# Patient Record
Sex: MALE | Race: WHITE | NOT HISPANIC OR LATINO | URBAN - METROPOLITAN AREA
[De-identification: names, ages, dates, MRNs, and addresses within clinical notes are randomized per-mention and may not be internally consistent; named-entity substitution may affect disease eponyms.]

---

## 2018-01-22 ENCOUNTER — EMERGENCY (EMERGENCY)
Facility: HOSPITAL | Age: 43
LOS: 1 days | End: 2018-01-22
Payer: COMMERCIAL

## 2018-01-22 PROCEDURE — 71046 X-RAY EXAM CHEST 2 VIEWS: CPT | Mod: 26

## 2018-01-22 PROCEDURE — 99284 EMERGENCY DEPT VISIT MOD MDM: CPT

## 2018-10-28 ENCOUNTER — EMERGENCY (EMERGENCY)
Facility: HOSPITAL | Age: 43
LOS: 1 days | End: 2018-10-28
Payer: COMMERCIAL

## 2018-10-28 PROCEDURE — 99284 EMERGENCY DEPT VISIT MOD MDM: CPT

## 2018-10-28 PROCEDURE — 70450 CT HEAD/BRAIN W/O DYE: CPT | Mod: 26

## 2018-11-19 ENCOUNTER — APPOINTMENT (OUTPATIENT)
Dept: NEUROSURGERY | Facility: CLINIC | Age: 43
End: 2018-11-19
Payer: COMMERCIAL

## 2018-11-19 VITALS
DIASTOLIC BLOOD PRESSURE: 78 MMHG | WEIGHT: 225 LBS | SYSTOLIC BLOOD PRESSURE: 140 MMHG | HEIGHT: 67 IN | BODY MASS INDEX: 35.31 KG/M2

## 2018-11-19 DIAGNOSIS — G95.20 UNSPECIFIED CORD COMPRESSION: ICD-10-CM

## 2018-11-19 DIAGNOSIS — I10 ESSENTIAL (PRIMARY) HYPERTENSION: ICD-10-CM

## 2018-11-19 PROCEDURE — 99204 OFFICE O/P NEW MOD 45 MIN: CPT

## 2021-07-12 ENCOUNTER — RX RENEWAL (OUTPATIENT)
Age: 46
End: 2021-07-12

## 2021-08-16 RX ORDER — MAGNESIUM OXIDE 500 MG
500 TABLET ORAL
Refills: 0 | Status: ACTIVE | COMMUNITY

## 2021-08-23 ENCOUNTER — APPOINTMENT (OUTPATIENT)
Dept: FAMILY MEDICINE | Facility: CLINIC | Age: 46
End: 2021-08-23
Payer: COMMERCIAL

## 2021-08-23 ENCOUNTER — NON-APPOINTMENT (OUTPATIENT)
Age: 46
End: 2021-08-23

## 2021-08-23 VITALS
HEART RATE: 79 BPM | WEIGHT: 239 LBS | HEIGHT: 68 IN | DIASTOLIC BLOOD PRESSURE: 78 MMHG | BODY MASS INDEX: 36.22 KG/M2 | OXYGEN SATURATION: 98 % | TEMPERATURE: 98 F | SYSTOLIC BLOOD PRESSURE: 128 MMHG

## 2021-08-23 PROCEDURE — 99213 OFFICE O/P EST LOW 20 MIN: CPT

## 2021-08-23 RX ORDER — DEXLANSOPRAZOLE 60 MG/1
60 CAPSULE, DELAYED RELEASE ORAL
Refills: 0 | Status: DISCONTINUED | COMMUNITY
End: 2021-08-23

## 2021-08-23 NOTE — HISTORY OF PRESENT ILLNESS
[FreeTextEntry1] : feeling well\par \par presents for  refills\par \par follows with cardio.\par \par \par august 4th  got  covid vac  #  1\par \par 3 days later  tested positive for covid\par \par feeling well\par \par pt. will check with pharmacist  regarding when he should get his second  vaccine

## 2021-11-10 ENCOUNTER — RX RENEWAL (OUTPATIENT)
Age: 46
End: 2021-11-10

## 2021-11-22 ENCOUNTER — APPOINTMENT (OUTPATIENT)
Dept: FAMILY MEDICINE | Facility: CLINIC | Age: 46
End: 2021-11-22
Payer: COMMERCIAL

## 2021-11-22 VITALS
HEIGHT: 67 IN | HEART RATE: 75 BPM | WEIGHT: 240.38 LBS | SYSTOLIC BLOOD PRESSURE: 114 MMHG | DIASTOLIC BLOOD PRESSURE: 88 MMHG | OXYGEN SATURATION: 97 % | BODY MASS INDEX: 37.73 KG/M2 | TEMPERATURE: 97.8 F

## 2021-11-22 PROCEDURE — 99214 OFFICE O/P EST MOD 30 MIN: CPT

## 2021-12-15 ENCOUNTER — RX RENEWAL (OUTPATIENT)
Age: 46
End: 2021-12-15

## 2022-01-25 ENCOUNTER — APPOINTMENT (OUTPATIENT)
Dept: FAMILY MEDICINE | Facility: CLINIC | Age: 47
End: 2022-01-25
Payer: COMMERCIAL

## 2022-01-25 VITALS
WEIGHT: 247 LBS | TEMPERATURE: 97.7 F | HEART RATE: 87 BPM | OXYGEN SATURATION: 97 % | BODY MASS INDEX: 38.77 KG/M2 | HEIGHT: 67 IN | SYSTOLIC BLOOD PRESSURE: 120 MMHG | DIASTOLIC BLOOD PRESSURE: 78 MMHG

## 2022-01-25 DIAGNOSIS — R10.11 RIGHT UPPER QUADRANT PAIN: ICD-10-CM

## 2022-01-25 DIAGNOSIS — R10.12 LEFT UPPER QUADRANT PAIN: ICD-10-CM

## 2022-01-25 DIAGNOSIS — E04.1 NONTOXIC SINGLE THYROID NODULE: ICD-10-CM

## 2022-01-25 PROCEDURE — 99214 OFFICE O/P EST MOD 30 MIN: CPT

## 2022-01-25 NOTE — PLAN
[FreeTextEntry1] : Encouraged to refer to Cardio - Rib discomfort\par Fasting Labs RTO\par Thyroid Ultrasound\par And Ultrasound\par Medications renewed

## 2022-01-25 NOTE — REVIEW OF SYSTEMS
[Abdominal Pain] : abdominal pain [Negative] : Heme/Lymph [FreeTextEntry4] : thyroid nodule [FreeTextEntry7] : left upper quadrant [FreeTextEntry9] : left rib discomfort

## 2022-01-25 NOTE — ASSESSMENT
[FreeTextEntry1] : Anxiety\par HTN\par Hyperlipids\par Left Upper quadrant Rib Pain\par Thyroid enlargement

## 2022-04-25 ENCOUNTER — RX RENEWAL (OUTPATIENT)
Age: 47
End: 2022-04-25

## 2022-05-02 ENCOUNTER — RX RENEWAL (OUTPATIENT)
Age: 47
End: 2022-05-02

## 2022-06-01 NOTE — PHYSICAL EXAM
[Normal] : normal rate, regular rhythm, normal S1 and S2 and no murmur heard [No Edema] : there was no peripheral edema 01-Jun-2022 20:24

## 2022-06-08 NOTE — REVIEW OF SYSTEMS
[Negative] : Constitutional [Chest Pain] : no chest pain [Shortness Of Breath] : no shortness of breath Adbry Pregnancy And Lactation Text: It is unknown if this medication will adversely affect pregnancy or breast feeding.

## 2022-07-29 ENCOUNTER — RX RENEWAL (OUTPATIENT)
Age: 47
End: 2022-07-29

## 2022-08-08 ENCOUNTER — RX RENEWAL (OUTPATIENT)
Age: 47
End: 2022-08-08

## 2022-09-01 ENCOUNTER — APPOINTMENT (OUTPATIENT)
Dept: FAMILY MEDICINE | Facility: CLINIC | Age: 47
End: 2022-09-01

## 2022-09-01 VITALS
TEMPERATURE: 97 F | HEART RATE: 71 BPM | HEIGHT: 67 IN | OXYGEN SATURATION: 96 % | BODY MASS INDEX: 39.55 KG/M2 | DIASTOLIC BLOOD PRESSURE: 82 MMHG | RESPIRATION RATE: 15 BRPM | SYSTOLIC BLOOD PRESSURE: 130 MMHG | WEIGHT: 252 LBS

## 2022-09-01 DIAGNOSIS — E66.9 OBESITY, UNSPECIFIED: ICD-10-CM

## 2022-09-01 DIAGNOSIS — R06.83 SNORING: ICD-10-CM

## 2022-09-01 PROCEDURE — 99214 OFFICE O/P EST MOD 30 MIN: CPT

## 2022-09-01 NOTE — PLAN
[FreeTextEntry1] : sleep study ordered\par \par \par continue same meds\par \par healthy diet   exercise

## 2022-09-01 NOTE — HEALTH RISK ASSESSMENT
[0] : 2) Feeling down, depressed, or hopeless: Not at all (0) [PHQ-2 Negative - No further assessment needed] : PHQ-2 Negative - No further assessment needed [[id number]] : 0

## 2022-09-01 NOTE — HISTORY OF PRESENT ILLNESS
[FreeTextEntry1] : presents for  refills\par \par reports   increase snoring   wakes up tired   wife states he has some apnea

## 2022-09-22 ENCOUNTER — APPOINTMENT (OUTPATIENT)
Dept: PULMONOLOGY | Facility: CLINIC | Age: 47
End: 2022-09-22

## 2022-11-29 ENCOUNTER — RX RENEWAL (OUTPATIENT)
Age: 47
End: 2022-11-29

## 2023-02-16 ENCOUNTER — APPOINTMENT (OUTPATIENT)
Dept: FAMILY MEDICINE | Facility: CLINIC | Age: 48
End: 2023-02-16
Payer: COMMERCIAL

## 2023-02-16 ENCOUNTER — NON-APPOINTMENT (OUTPATIENT)
Age: 48
End: 2023-02-16

## 2023-02-16 VITALS
DIASTOLIC BLOOD PRESSURE: 80 MMHG | HEIGHT: 67 IN | SYSTOLIC BLOOD PRESSURE: 120 MMHG | BODY MASS INDEX: 36.88 KG/M2 | WEIGHT: 235 LBS | OXYGEN SATURATION: 94 % | HEART RATE: 73 BPM | TEMPERATURE: 97.3 F | RESPIRATION RATE: 15 BRPM

## 2023-02-16 DIAGNOSIS — M79.642 PAIN IN LEFT HAND: ICD-10-CM

## 2023-02-16 PROCEDURE — 99214 OFFICE O/P EST MOD 30 MIN: CPT

## 2023-02-16 NOTE — REVIEW OF SYSTEMS
[Negative] : Constitutional [Chest Pain] : no chest pain [Shortness Of Breath] : no shortness of breath [FreeTextEntry2] : except as stated in cc [FreeTextEntry9] : left hand  wrist pain as stated in cc

## 2023-02-16 NOTE — PLAN
[FreeTextEntry1] : continue same  meds\par \par \par \par ortho   hand specialist    has appointment  tomorrow

## 2023-02-16 NOTE — HISTORY OF PRESENT ILLNESS
[FreeTextEntry1] : presents for  refills\par \par lexapro working well\par Is causing ED  \par viagra working fairly well\par \par \par recent about 3 weeks ago fell injured left hand  x ray  neg\par pain persists thumb to wrist\par not able to touch fifth finger to thumb\par \par will send to hand specialist

## 2023-02-16 NOTE — HEALTH RISK ASSESSMENT
[0] : 2) Feeling down, depressed, or hopeless: Not at all (0) [PHQ-2 Negative - No further assessment needed] : PHQ-2 Negative - No further assessment needed [[id number]] : 0

## 2023-08-16 ENCOUNTER — RX RENEWAL (OUTPATIENT)
Age: 48
End: 2023-08-16

## 2023-09-08 ENCOUNTER — RX RENEWAL (OUTPATIENT)
Age: 48
End: 2023-09-08

## 2023-09-20 ENCOUNTER — APPOINTMENT (OUTPATIENT)
Dept: FAMILY MEDICINE | Facility: CLINIC | Age: 48
End: 2023-09-20
Payer: COMMERCIAL

## 2023-09-20 VITALS
SYSTOLIC BLOOD PRESSURE: 130 MMHG | TEMPERATURE: 98.8 F | BODY MASS INDEX: 39.08 KG/M2 | OXYGEN SATURATION: 98 % | HEIGHT: 67 IN | WEIGHT: 249 LBS | DIASTOLIC BLOOD PRESSURE: 80 MMHG | HEART RATE: 75 BPM | RESPIRATION RATE: 16 BRPM

## 2023-09-20 PROCEDURE — 99213 OFFICE O/P EST LOW 20 MIN: CPT

## 2023-12-07 NOTE — HEALTH RISK ASSESSMENT
Progress  Note        Chief Complaint   Patient presents with    Fall     GOT UP [facility], DOESN'T KNOW IF PASSED OUT, LEFT RIB PAIN     Historical Issues:  Current Facility-Administered Medications   Medication Dose Route Frequency Provider Last Rate Last Admin    potassium chloride (KLOR-CON M) extended release tablet 40 mEq  40 mEq Oral Daily Philip Daigle MD   40 mEq at 12/07/23 0953    carvedilol (COREG) tablet 12.5 mg  12.5 mg Oral BID Jasper Atkinson MD   12.5 mg at 12/07/23 6181    cloNIDine (CATAPRES) tablet 0.2 mg  0.2 mg Oral TID Jasper Atkinson MD   0.2 mg at 12/07/23 0953    dilTIAZem (CARDIZEM 12 HR) extended release capsule 120 mg  120 mg Oral BID Jasper Atkinson MD   120 mg at 12/07/23 2575    sodium chloride tablet 1 g  1 g Oral BID Jasper Atkinson MD   1 g at 12/07/23 2257    sodium chloride flush 0.9 % injection 5-40 mL  5-40 mL IntraVENous 2 times per day Jasper Atkinson MD   10 mL at 12/06/23 2010    sodium chloride flush 0.9 % injection 5-40 mL  5-40 mL IntraVENous PRN Jasper Atkinson MD        0.9 % sodium chloride infusion   IntraVENous PRN Jasper Atkinosn MD        potassium chloride Dericsaadmayur HouKathe MAYUR) extended release tablet 40 mEq  40 mEq Oral PRN Jasper Atkinson MD   40 mEq at 12/06/23 2009    Or    potassium bicarb-citric acid (EFFER-K) effervescent tablet 40 mEq  40 mEq Oral PRN Jasper Atkinson MD        Or    potassium chloride 10 mEq/100 mL IVPB (Peripheral Line)  10 mEq IntraVENous PRN Jasper Atkinson MD        magnesium sulfate 2000 mg in 50 mL IVPB premix  2,000 mg IntraVENous PRN Jasper Atkinson MD   Stopped at 12/06/23 1121    ondansetron (ZOFRAN-ODT) disintegrating tablet 4 mg  4 mg Oral Q8H PRN Jasper Atkinson MD        Or    ondansetron [facility]) injection 4 mg  4 mg IntraVENous Q6H PRN Jasper Atkinson MD   4 mg at 12/06/23 1420    polyethylene glycol (GLYCOLAX) packet 17 g  17 g Oral Daily PRN Jasper Atkinson MD   17 g at 12/05/23 2152    acetaminophen (TYLENOL) tablet 650 mg  650 mg Oral Q6H PRN Jasper Atkinson MD   650 mg at [0] : 2) Feeling down, depressed, or hopeless: Not at all (0) [[id number]] : 0

## 2024-04-09 ENCOUNTER — APPOINTMENT (OUTPATIENT)
Dept: FAMILY MEDICINE | Facility: CLINIC | Age: 49
End: 2024-04-09
Payer: COMMERCIAL

## 2024-04-09 VITALS
TEMPERATURE: 98.1 F | WEIGHT: 250 LBS | HEART RATE: 76 BPM | DIASTOLIC BLOOD PRESSURE: 76 MMHG | BODY MASS INDEX: 39.24 KG/M2 | HEIGHT: 67 IN | SYSTOLIC BLOOD PRESSURE: 104 MMHG | RESPIRATION RATE: 12 BRPM | OXYGEN SATURATION: 96 %

## 2024-04-09 DIAGNOSIS — K21.9 GASTRO-ESOPHAGEAL REFLUX DISEASE W/OUT ESOPHAGITIS: ICD-10-CM

## 2024-04-09 DIAGNOSIS — F41.9 ANXIETY DISORDER, UNSPECIFIED: ICD-10-CM

## 2024-04-09 DIAGNOSIS — N52.9 MALE ERECTILE DYSFUNCTION, UNSPECIFIED: ICD-10-CM

## 2024-04-09 DIAGNOSIS — E78.5 HYPERLIPIDEMIA, UNSPECIFIED: ICD-10-CM

## 2024-04-09 DIAGNOSIS — E34.9 ENDOCRINE DISORDER, UNSPECIFIED: ICD-10-CM

## 2024-04-09 DIAGNOSIS — I10 ESSENTIAL (PRIMARY) HYPERTENSION: ICD-10-CM

## 2024-04-09 DIAGNOSIS — R53.83 OTHER FATIGUE: ICD-10-CM

## 2024-04-09 PROCEDURE — 36415 COLL VENOUS BLD VENIPUNCTURE: CPT

## 2024-04-09 PROCEDURE — 99214 OFFICE O/P EST MOD 30 MIN: CPT

## 2024-04-09 RX ORDER — SILDENAFIL 100 MG/1
100 TABLET, FILM COATED ORAL
Qty: 30 | Refills: 3 | Status: ACTIVE | COMMUNITY
Start: 1900-01-01 | End: 1900-01-01

## 2024-04-09 RX ORDER — OMEPRAZOLE 20 MG/1
20 CAPSULE, DELAYED RELEASE ORAL
Qty: 90 | Refills: 1 | Status: ACTIVE | COMMUNITY
Start: 2022-04-25 | End: 1900-01-01

## 2024-04-09 RX ORDER — ESCITALOPRAM OXALATE 20 MG/1
20 TABLET ORAL
Qty: 90 | Refills: 1 | Status: ACTIVE | COMMUNITY
Start: 2022-05-02 | End: 1900-01-01

## 2024-04-09 RX ORDER — ALPRAZOLAM 0.25 MG/1
0.25 TABLET ORAL
Qty: 30 | Refills: 0 | Status: ACTIVE | COMMUNITY
Start: 1900-01-01 | End: 1900-01-01

## 2024-04-09 NOTE — REVIEW OF SYSTEMS
[Fatigue] : fatigue [Impotence] : impotence [Poor Libido] : poor libido [Negative] : Heme/Lymph [FreeTextEntry2] : Anxiety

## 2024-04-09 NOTE — ASSESSMENT
[FreeTextEntry1] : Presents today for refills.  Verbalizes concerns about his erectile Dysfunction and that his medications are causing symptoms to worsen.  Medications renewed with patient.  He then states he has a known history of Low Testosterone. This was never treated.  Anxiety Erectile Dysfunction Fatigue Gerd HLD HTN

## 2024-04-09 NOTE — HEALTH RISK ASSESSMENT
[No] : No [No falls in past year] : Patient reported no falls in the past year [0] : 2) Feeling down, depressed, or hopeless: Not at all (0) [PHQ-2 Negative - No further assessment needed] : PHQ-2 Negative - No further assessment needed [[id number]] : 0

## 2024-04-09 NOTE — HISTORY OF PRESENT ILLNESS
[FreeTextEntry1] : Acne\par Education - avoid picking at pimples.\par Continue topicals as doing, glyderm mask.\par f/u monthly for now for repeated TCA txs.\par Oil free preps, sunscreens. [FreeTextEntry1] : Presents today for medication refills.  Verbalizing concerns that is Erectile disfunction has worsens.  Concerns that medications may be causing the problem.  Medications and side effects reviewed with patient.  He also states he has history of Low testosterone which was never treated.

## 2024-04-16 LAB
TESTOST FREE SERPL-MCNC: 4.4 PG/ML
TESTOST SERPL-MCNC: 336 NG/DL

## 2024-04-19 ENCOUNTER — APPOINTMENT (OUTPATIENT)
Dept: FAMILY MEDICINE | Facility: CLINIC | Age: 49
End: 2024-04-19
Payer: COMMERCIAL

## 2024-04-19 PROCEDURE — 36415 COLL VENOUS BLD VENIPUNCTURE: CPT

## 2024-04-25 LAB
TESTOST FREE SERPL-MCNC: 8.5 PG/ML
TESTOST SERPL-MCNC: 471 NG/DL

## 2024-08-05 ENCOUNTER — APPOINTMENT (OUTPATIENT)
Dept: FAMILY MEDICINE | Facility: CLINIC | Age: 49
End: 2024-08-05

## 2024-08-05 PROCEDURE — 99214 OFFICE O/P EST MOD 30 MIN: CPT

## 2024-08-05 NOTE — HISTORY OF PRESENT ILLNESS
[FreeTextEntry1] : feeling well presents for refills blood pressure stable compliant with meds GERD  well controlled on medication

## 2024-08-09 ENCOUNTER — NON-APPOINTMENT (OUTPATIENT)
Age: 49
End: 2024-08-09

## 2025-04-02 ENCOUNTER — RX RENEWAL (OUTPATIENT)
Age: 50
End: 2025-04-02

## 2025-04-23 ENCOUNTER — APPOINTMENT (OUTPATIENT)
Dept: UROLOGY | Facility: CLINIC | Age: 50
End: 2025-04-23